# Patient Record
Sex: MALE | Race: OTHER | Employment: FULL TIME | URBAN - METROPOLITAN AREA
[De-identification: names, ages, dates, MRNs, and addresses within clinical notes are randomized per-mention and may not be internally consistent; named-entity substitution may affect disease eponyms.]

---

## 2021-04-12 ENCOUNTER — HOSPITAL ENCOUNTER (INPATIENT)
Facility: HOSPITAL | Age: 36
LOS: 2 days | Discharge: HOME | DRG: 391 | End: 2021-04-14
Attending: HOSPITALIST | Admitting: HOSPITALIST

## 2021-04-12 ENCOUNTER — HOSPITAL ENCOUNTER (OUTPATIENT)
Age: 36
Discharge: ANOTHER HOSPITAL | End: 2021-04-12

## 2021-04-12 ENCOUNTER — APPOINTMENT (OUTPATIENT)
Dept: CT IMAGING | Age: 36
End: 2021-04-12
Attending: NURSE PRACTITIONER

## 2021-04-12 VITALS
DIASTOLIC BLOOD PRESSURE: 89 MMHG | TEMPERATURE: 98 F | RESPIRATION RATE: 14 BRPM | OXYGEN SATURATION: 98 % | SYSTOLIC BLOOD PRESSURE: 125 MMHG | HEART RATE: 89 BPM

## 2021-04-12 DIAGNOSIS — K63.0 PERICOLONIC ABSCESS: ICD-10-CM

## 2021-04-12 DIAGNOSIS — R10.32 LLQ PAIN: Primary | ICD-10-CM

## 2021-04-12 DIAGNOSIS — R10.9 ABDOMINAL PAIN, ACUTE: ICD-10-CM

## 2021-04-12 PROBLEM — K65.1 INTRA-ABDOMINAL ABSCESS (HCC): Status: ACTIVE | Noted: 2021-04-12

## 2021-04-12 PROCEDURE — 80047 BASIC METABLC PNL IONIZED CA: CPT | Performed by: NURSE PRACTITIONER

## 2021-04-12 PROCEDURE — 85025 COMPLETE CBC W/AUTO DIFF WBC: CPT | Performed by: NURSE PRACTITIONER

## 2021-04-12 PROCEDURE — 81002 URINALYSIS NONAUTO W/O SCOPE: CPT | Performed by: NURSE PRACTITIONER

## 2021-04-12 PROCEDURE — U0002 COVID-19 LAB TEST NON-CDC: HCPCS | Performed by: NURSE PRACTITIONER

## 2021-04-12 PROCEDURE — 36415 COLL VENOUS BLD VENIPUNCTURE: CPT | Performed by: NURSE PRACTITIONER

## 2021-04-12 PROCEDURE — 74177 CT ABD & PELVIS W/CONTRAST: CPT | Performed by: NURSE PRACTITIONER

## 2021-04-12 PROCEDURE — 99205 OFFICE O/P NEW HI 60 MIN: CPT | Performed by: NURSE PRACTITIONER

## 2021-04-12 PROCEDURE — 99253 IP/OBS CNSLTJ NEW/EST LOW 45: CPT | Performed by: SURGERY

## 2021-04-12 PROCEDURE — 99223 1ST HOSP IP/OBS HIGH 75: CPT | Performed by: INTERNAL MEDICINE

## 2021-04-12 RX ORDER — SODIUM CHLORIDE 9 MG/ML
INJECTION, SOLUTION INTRAVENOUS CONTINUOUS
Status: DISCONTINUED | OUTPATIENT
Start: 2021-04-12 | End: 2021-04-14

## 2021-04-12 RX ORDER — HEPARIN SODIUM 5000 [USP'U]/ML
5000 INJECTION, SOLUTION INTRAVENOUS; SUBCUTANEOUS EVERY 8 HOURS SCHEDULED
Status: DISCONTINUED | OUTPATIENT
Start: 2021-04-12 | End: 2021-04-14

## 2021-04-12 RX ORDER — MORPHINE SULFATE 2 MG/ML
2 INJECTION, SOLUTION INTRAMUSCULAR; INTRAVENOUS EVERY 2 HOUR PRN
Status: DISCONTINUED | OUTPATIENT
Start: 2021-04-12 | End: 2021-04-14

## 2021-04-12 RX ORDER — ACETAMINOPHEN 325 MG/1
325 TABLET ORAL EVERY 6 HOURS PRN
COMMUNITY

## 2021-04-12 RX ORDER — SODIUM CHLORIDE 9 MG/ML
1000 INJECTION, SOLUTION INTRAVENOUS ONCE
Status: COMPLETED | OUTPATIENT
Start: 2021-04-12 | End: 2021-04-12

## 2021-04-12 RX ORDER — ACETAMINOPHEN 325 MG/1
650 TABLET ORAL EVERY 6 HOURS PRN
Status: DISCONTINUED | OUTPATIENT
Start: 2021-04-12 | End: 2021-04-13

## 2021-04-12 RX ORDER — MORPHINE SULFATE 2 MG/ML
1 INJECTION, SOLUTION INTRAMUSCULAR; INTRAVENOUS EVERY 2 HOUR PRN
Status: DISCONTINUED | OUTPATIENT
Start: 2021-04-12 | End: 2021-04-14

## 2021-04-12 RX ORDER — MORPHINE SULFATE 4 MG/ML
4 INJECTION, SOLUTION INTRAMUSCULAR; INTRAVENOUS EVERY 2 HOUR PRN
Status: DISCONTINUED | OUTPATIENT
Start: 2021-04-12 | End: 2021-04-14

## 2021-04-12 NOTE — PROGRESS NOTES
Received call from CT scan that MD will not perform CT drainage tonight d/t placement of abscess and will re-asess tomorrow. DAMON Norman paged to make aware. 1700 - Spoke with Cherie in CT and OK to give heparin dose but hold 2200 and 0600 dose.

## 2021-04-12 NOTE — CONSULTS
[facility]  Report of Consultation    Cale Richardson Patient Status:  Inpatient    1985 MRN [id number]   [facility] 3SW-A Attending Anastasia Cedeno MD   Hosp Day # 0 PCP No primary care provider on file.      Requ history is noncontributory. History:  No past medical history on file. No past surgical history on file. History reviewed. No pertinent family history.       Past medical history:  Noncontributory    Past social history:  The patient smokes 1 pack of c to person, place, and time. He appears well-developed and well-nourished. HENT:   Head: Normocephalic and atraumatic. Eyes: Pupils are equal, round, and reactive to light. EOM are normal.   Neck: Neck supple.    Cardiovascular: Normal rate and regular r abscess. 4. N.p.o.  5. Start IV antibiotic therapy. 6. Pain control as needed. 7. Antiemetics as needed. 8. Thank you for consultation. We will continue to follow. Madhu Otto  4/12/2021  3:52 PM    ADDENDUM:     Patient was seen and examined.

## 2021-04-12 NOTE — PLAN OF CARE
Pt A&Ox4. On room air. VSS. NPO. Last BM 4/12/21. Voiding without difficulty. IV zozyn Q8. Pain controlled at this time. Fall precautions in place and pt reminded to \"call; don't fall. \" Pt oriented to room and call light. POC discussed with pt.  Will con

## 2021-04-12 NOTE — H&P
[facility]  History and Physical     [address] Patient Status:  Inpatient    1985 MRN [id number]   [facility] 3SW-A Attending Lazarus Handy, [address] [facility] Day # 0 PCP No primary care provider on file.      Dilia Marmolejo rubs or gallops. Equal pulses. Chest and Back: No tenderness or deformity. Abdomen: Soft, diffuse tender, nondistended. Positive bowel sounds. No rebound, guarding or organomegaly. Neurologic: No focal neurological deficits. CNII-XII grossly intact.

## 2021-04-12 NOTE — ED INITIAL ASSESSMENT (HPI)
Pt sts intermittent lower abd pain, diarrhea, body aches, fever began 7 days. Fever as high as 103 that lasted 3 days. No fever since Saturday. Abd pain increased since Saturday. Feels bloated, intermittent nausea. Denies emesis. Formed stool today.

## 2021-04-12 NOTE — ED PROVIDER NOTES
Patient Seen in: [facility]      History   Patient presents with:  Abdomen/Flank Pain    Stated Complaint: abd pain    HPI/Subjective:   51-year-old male who presents to the IC with complaints of left lower quadrant pain for last 7 days.   Kathy Cornea clear. Ears: normal pending membranes. Normal external auditory canals   Nose: Nasal mucosa. No sinus tenderness. No nasal congestion. Throat: Oropharynx noninjected. No lip, tongue, throat swelling.  Buccal mucosa moist: yes  EYES: ANDREW IVAN techniques were used. Dose information is transmitted to the ACR [facility] [employer] [facility]) NRDR ([address]) which includes the Dose Index Registry.   PATIENT STATED HISTORY:(As transcribed by Technologist)  Patient states he has OTHER:                  None. CONCLUSION:  Acute inflammatory appearance of the sigmoid colon with 2 separate foci of pericolonic abscess formation.   Diverticulitis can produce this appearance, but there does not appear to be widespread divert [place] [address] [address]  [phone]  Today            Medications Prescribed:  Discharge Medication List as of 4/12/2021  1:44 PM

## 2021-04-13 PROCEDURE — 99232 SBSQ HOSP IP/OBS MODERATE 35: CPT | Performed by: HOSPITALIST

## 2021-04-13 PROCEDURE — 99232 SBSQ HOSP IP/OBS MODERATE 35: CPT | Performed by: SURGERY

## 2021-04-13 RX ORDER — FAMOTIDINE 10 MG/ML
20 INJECTION, SOLUTION INTRAVENOUS DAILY
Status: DISCONTINUED | OUTPATIENT
Start: 2021-04-13 | End: 2021-04-14

## 2021-04-13 RX ORDER — ACETAMINOPHEN 325 MG/1
650 TABLET ORAL EVERY 4 HOURS PRN
Status: DISCONTINUED | OUTPATIENT
Start: 2021-04-13 | End: 2021-04-14

## 2021-04-13 RX ORDER — FAMOTIDINE 20 MG/1
20 TABLET ORAL DAILY
Status: DISCONTINUED | OUTPATIENT
Start: 2021-04-13 | End: 2021-04-14

## 2021-04-13 NOTE — PLAN OF CARE
Pt A&O. On room air. Scds to BLE. Tolerating CLD. Had BM today. States had loose stool. Awaiting next stool to obtain sample for WBC, Culture, Shigatoxin, and c-diff. Voiding w/o difficulty. Pain managed with oral tylenol. Up independently.  Tolerating IV a
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
## 2021-04-13 NOTE — PROGRESS NOTES
[facility]  Progress Note    [address] Patient Status:  Inpatient    1985 MRN [id number]   [facility] 3SW-A Attending Wilver Elizabeth MD   Hosp Day # 1 PCP No primary care provider on file. Subjective:   The p liquid diet. 3. Continue IV antibiotics with Zosyn. 4. Will order stool studies. 5. Plan for outpatient colonoscopy in roughly 6 weeks as an outpatient. 6. Anticipate discharge within the next 48 hours.  Will require at least an additional 24h of an

## 2021-04-13 NOTE — PROGRESS NOTES
[facility]  Progress Note     Thelma Julianing Patient Status:  Inpatient    1985 MRN [id number]   [facility] 3SW-A Attending Belkis Curry MD   Hosp Day # 1 PCP No primary care provider on file.      Chief Complain Kinase  No results for input(s): CK in the last 168 hours. Inflammatory Markers  No results for input(s): CRP, FRANKY, LDH, DDIMER in the last 168 hours. Imaging: Imaging data reviewed in Epic.     Medications:   • Heparin Sodium (Porcine)  5,000 Units S

## 2021-04-13 NOTE — PLAN OF CARE
A&O x4. VSS. Room air, . Moderate abdominal pain controlled with tylenol PRN. Bowel sounds present, abdomen soft and tender. Ambulating ad robert. Voids freely. Bilateral SCD's. IVF and IV abx infusing as ordered. NPO.  Reviewed POC, pain management, IS use

## 2021-04-14 VITALS
HEART RATE: 72 BPM | TEMPERATURE: 98 F | RESPIRATION RATE: 16 BRPM | SYSTOLIC BLOOD PRESSURE: 107 MMHG | DIASTOLIC BLOOD PRESSURE: 76 MMHG | OXYGEN SATURATION: 95 %

## 2021-04-14 PROCEDURE — 99238 HOSP IP/OBS DSCHRG MGMT 30/<: CPT | Performed by: HOSPITALIST

## 2021-04-14 PROCEDURE — 99232 SBSQ HOSP IP/OBS MODERATE 35: CPT | Performed by: PHYSICIAN ASSISTANT

## 2021-04-14 RX ORDER — AMOXICILLIN AND CLAVULANATE POTASSIUM 875; 125 MG/1; MG/1
1 TABLET, FILM COATED ORAL 2 TIMES DAILY
Qty: 20 TABLET | Refills: 0 | Status: SHIPPED | OUTPATIENT
Start: 2021-04-14 | End: 2021-04-15 | Stop reason: ALTCHOICE

## 2021-04-14 NOTE — PLAN OF CARE
t A&O. On room air. Scds to BLE. Tolerating CLD. Last BM 4/13/21. Voiding w/o difficulty. Pain managed with oral tylenol. Up independently. Tolerating IV atbx as ordered. Pt improved w/o abscess drainage intervention.  Awaiting for surgery to round so pt ma

## 2021-04-14 NOTE — PROGRESS NOTES
[facility]  Progress Note    [address] Patient Status:  Inpatient    1985 MRN [id number]   [facility] 3SW-A Attending Douglas Leos MD   Hosp Day # 2 PCP No primary care provider on file. Subjective:   The p JEAN CARLOS  4/14/2021  11:53 AM

## 2021-04-14 NOTE — PLAN OF CARE
A&O x 4. VSS. On RA. LLQ pain well controlled with Tylenol PRN. Up at robert to bathroom, voiding without issue. Tolerating clear liquid diet. No c/o continued loose stool. IVF/IV Zosyn. SCD's when in bed/Heparin. Reviewed POC and pain management.  Plan home w

## 2021-04-15 ENCOUNTER — TELEPHONE (OUTPATIENT)
Dept: SURGERY | Facility: CLINIC | Age: 36
End: 2021-04-15

## 2021-04-15 RX ORDER — CIPROFLOXACIN 500 MG/1
500 TABLET, FILM COATED ORAL 2 TIMES DAILY
Qty: 20 TABLET | Refills: 0 | Status: SHIPPED | OUTPATIENT
Start: 2021-04-15 | End: 2021-04-25

## 2021-04-15 RX ORDER — LEVOFLOXACIN 500 MG/1
500 TABLET, FILM COATED ORAL DAILY
Qty: 10 TABLET | Refills: 0 | Status: SHIPPED | OUTPATIENT
Start: 2021-04-15 | End: 2021-04-15

## 2021-04-15 RX ORDER — CIPROFLOXACIN 500 MG/1
500 TABLET, FILM COATED ORAL 2 TIMES DAILY
Qty: 20 TABLET | Refills: 0 | Status: SHIPPED | OUTPATIENT
Start: 2021-04-15 | End: 2021-10-14

## 2021-04-15 RX ORDER — METRONIDAZOLE 250 MG/1
250 TABLET ORAL 3 TIMES DAILY
Qty: 30 TABLET | Refills: 0 | Status: SHIPPED | OUTPATIENT
Start: 2021-04-15 | End: 2021-10-14

## 2021-04-15 NOTE — TELEPHONE ENCOUNTER
Patient's wife called stating pharmacy did not receive the Cipro, another script was ordered and sent to pharmacy. Patient verified confirmation of script.

## 2021-04-16 ENCOUNTER — TELEPHONE (OUTPATIENT)
Dept: SURGERY | Facility: CLINIC | Age: 36
End: 2021-04-16

## 2021-04-16 NOTE — TELEPHONE ENCOUNTER
Pt's wife calling stating that the prescription for Ciprofloaxacin HCL (CIPRO) was cancelled per [address] in the pt's chart. Pt's wife requesting a new order for this medication be sent to the [address] in chart.

## 2021-04-17 NOTE — DISCHARGE SUMMARY
[facility]PITAL[facility]  DISCHARGE SUMMARY     Howardroseanna Bautistarafael Patient Status:  Inpatient    1985 MRN [id number]   [facility] 3SW-A Attending No att. providers found   [facility] Day # 2 PCP No primary care provider on file.      Evaristo Tarango No murmurs, rubs or gallops. Abdomen: Soft, nontender, nondistended. Positive bowel sounds. No rebound or guarding. Neurologic: No focal neurological deficits. Musculoskeletal: Moves all extremities. Extremities: No edema.   -------------------------

## 2021-04-26 ENCOUNTER — TELEPHONE (OUTPATIENT)
Dept: SURGERY | Facility: CLINIC | Age: 36
End: 2021-04-26

## 2021-04-26 NOTE — TELEPHONE ENCOUNTER
Patient's wife called stating patient would like to reschedule appointment from today to Wednesday and asked for a refill on Cipro. Patient states is not having abdominal pain currently.  Per PA patient advised refill will be considered at in-office appoint

## 2021-04-28 ENCOUNTER — OFFICE VISIT (OUTPATIENT)
Dept: SURGERY | Facility: CLINIC | Age: 36
End: 2021-04-28

## 2021-04-28 VITALS
BODY MASS INDEX: 24.99 KG/M2 | HEART RATE: 75 BPM | SYSTOLIC BLOOD PRESSURE: 112 MMHG | WEIGHT: 150 LBS | HEIGHT: 65 IN | TEMPERATURE: 97 F | DIASTOLIC BLOOD PRESSURE: 72 MMHG

## 2021-04-28 DIAGNOSIS — Z01.818 PRE-OP TESTING: ICD-10-CM

## 2021-04-28 DIAGNOSIS — K65.1 INTRA-ABDOMINAL ABSCESS (HCC): ICD-10-CM

## 2021-04-28 DIAGNOSIS — K57.92 ACUTE DIVERTICULITIS: Primary | ICD-10-CM

## 2021-04-28 PROCEDURE — 99213 OFFICE O/P EST LOW 20 MIN: CPT | Performed by: SURGERY

## 2021-04-28 PROCEDURE — 3074F SYST BP LT 130 MM HG: CPT | Performed by: SURGERY

## 2021-04-28 PROCEDURE — 3078F DIAST BP <80 MM HG: CPT | Performed by: SURGERY

## 2021-04-28 PROCEDURE — 3008F BODY MASS INDEX DOCD: CPT | Performed by: SURGERY

## 2021-04-28 RX ORDER — SODIUM, POTASSIUM,MAG SULFATES 17.5-3.13G
SOLUTION, RECONSTITUTED, ORAL ORAL
Qty: 1 KIT | Refills: 0 | Status: SHIPPED | OUTPATIENT
Start: 2021-04-28

## 2021-04-28 NOTE — H&P
New Patient Visit Note       Active Problems      1. Acute diverticulitis    2. Intra-abdominal abscess ([address]    3.  Pre-op testing        Chief Complaint   Patient presents with:  Diverticulitis: NP divertic- PT states had attack two weeks ago denies abd p Cans of beer per week      Drug use: Never       Current Outpatient Medications:   •  Na Sulfate-K Sulfate-Mg Sulf (SUPREP BOWEL PREP KIT) 17.5-3.13-1.6 GM/177ML Oral Solution, Take one bottle at 5pm & 9pm the night before procedure, Disp: 1 kit, Rfl: 0  • muscle usage or respiratory distress. Breath sounds: No decreased breath sounds, wheezing, rhonchi or rales. Chest:      Chest wall: No mass. Abdominal:      General: Bowel sounds are normal. There is no distension.       Palpations: Abdomen is sof his wife in detail. Risks included, but were not limited to, anastomotic breakdown, injury to other intra-abdominal organs, injury to the ureter, wound infection, and prolonged postoperative ileus.   · At this point, the patient does not wish to proceed wit

## 2021-04-30 ENCOUNTER — TELEPHONE (OUTPATIENT)
Dept: HEMATOLOGY/ONCOLOGY | Age: 36
End: 2021-04-30

## 2021-06-01 ENCOUNTER — LAB ENCOUNTER (OUTPATIENT)
Dept: LAB | Age: 36
End: 2021-06-01
Attending: SURGERY

## 2021-06-01 DIAGNOSIS — Z01.818 PRE-OP TESTING: ICD-10-CM

## 2021-06-04 ENCOUNTER — MED REC SCAN ONLY (OUTPATIENT)
Dept: SURGERY | Facility: CLINIC | Age: 36
End: 2021-06-04

## 2021-06-04 ENCOUNTER — TELEPHONE (OUTPATIENT)
Dept: SURGERY | Facility: CLINIC | Age: 36
End: 2021-06-04

## 2021-06-04 NOTE — TELEPHONE ENCOUNTER
Rec'd a call from Dr. Edel Shook me that pt was a no show for his Colonoscopy at [facility] today 6-4.  I called pt no answer, I left a message asking pt to call the office to reschedule his procedure

## 2021-10-14 ENCOUNTER — OFFICE VISIT (OUTPATIENT)
Dept: FAMILY MEDICINE CLINIC | Facility: CLINIC | Age: 36
End: 2021-10-14
Payer: COMMERCIAL

## 2021-10-14 VITALS
TEMPERATURE: 98 F | OXYGEN SATURATION: 99 % | SYSTOLIC BLOOD PRESSURE: 120 MMHG | BODY MASS INDEX: 24.45 KG/M2 | HEART RATE: 62 BPM | WEIGHT: 145 LBS | HEIGHT: 64.5 IN | DIASTOLIC BLOOD PRESSURE: 76 MMHG

## 2021-10-14 DIAGNOSIS — Z87.19 HISTORY OF DIVERTICULAR ABSCESS: ICD-10-CM

## 2021-10-14 DIAGNOSIS — F10.10 ALCOHOL ABUSE: ICD-10-CM

## 2021-10-14 DIAGNOSIS — R10.32 LEFT LOWER QUADRANT PAIN: Primary | ICD-10-CM

## 2021-10-14 PROCEDURE — 3074F SYST BP LT 130 MM HG: CPT | Performed by: NURSE PRACTITIONER

## 2021-10-14 PROCEDURE — 3078F DIAST BP <80 MM HG: CPT | Performed by: NURSE PRACTITIONER

## 2021-10-14 PROCEDURE — 3008F BODY MASS INDEX DOCD: CPT | Performed by: NURSE PRACTITIONER

## 2021-10-14 PROCEDURE — 99213 OFFICE O/P EST LOW 20 MIN: CPT | Performed by: NURSE PRACTITIONER

## 2021-10-14 RX ORDER — CIPROFLOXACIN 500 MG/1
500 TABLET, FILM COATED ORAL 2 TIMES DAILY
Qty: 20 TABLET | Refills: 0 | Status: SHIPPED | OUTPATIENT
Start: 2021-10-14 | End: 2021-10-24

## 2021-10-14 RX ORDER — METRONIDAZOLE 250 MG/1
250 TABLET ORAL 3 TIMES DAILY
Qty: 30 TABLET | Refills: 0 | Status: SHIPPED | OUTPATIENT
Start: 2021-10-14 | End: 2021-10-24

## 2021-10-14 NOTE — PROGRESS NOTES
Subjective:   Patient ID: Roz Medellin is a 39year old male. Patient presents to the clinic today accompanied by his wife for evaluation of lower left abdominal pain.   Reports his symptoms began approximately Sunday after eating a lot tablet 0   • Na Sulfate-K Sulfate-Mg Sulf (SUPREP BOWEL PREP KIT) 17.5-3.13-1.6 GM/177ML Oral Solution Take one bottle at 5pm & 9pm the night before procedure (Patient not taking: Reported on 10/14/2021) 1 kit 0   • acetaminophen 325 MG Oral Tab Take 325 m unable to pass gas, fever etc.  He is agreeable to the above plan and verbalized understanding. Patient states he is willing to see psych regarding alcohol abuse. [facility] referral placed.          Meds This Visit:  Requested Prescriptions     Signed Prescr

## 2021-10-14 NOTE — PATIENT INSTRUCTIONS
Start antibiotics as prescribed. Avoid foods that are acidic, seedy or could cause inflammation. Avoid carbonated and caffeinated drinks. Avoid foods that are too spicy or greasy. Follow-up with general surgery for colonoscopy.   Even if you begin to fee temperature. Tell your healthcare provider if you have a rising temperature. Preventing future attacks  Once you have an episode of diverticulitis, you are at risk for having it again.  But you may be able to lower your risk by eating a high-fiber diet (20 8/1/2019  © 7113-6902 [employer] [address]. All rights reserved. This information is not intended as a substitute for professional medical care. Always follow your healthcare professional's instructions.

## 2021-10-27 ENCOUNTER — TELEPHONE (OUTPATIENT)
Dept: FAMILY MEDICINE CLINIC | Facility: CLINIC | Age: 36
End: 2021-10-27

## 2021-10-27 DIAGNOSIS — Z12.11 ENCOUNTER FOR SCREENING COLONOSCOPY: Primary | ICD-10-CM

## 2021-10-27 NOTE — TELEPHONE ENCOUNTER
With patients with [facility], referral placed for [address] [place] [address]. She is asked to give them a call in 24 hours, she is asked to follow-up with the office should the referral not be in network. She is agreeable to this plan verbalized understanding.     Patient'

## 2021-10-27 NOTE — TELEPHONE ENCOUNTER
LOV 10/14/2021 jessica Fish - please advise if pt needs to follow up in office? Referral to general surgery for colonoscopy order pending, please review.  Thank you

## 2021-10-27 NOTE — TELEPHONE ENCOUNTER
Opal Montes pt's wife is calling would like to know if pt needs a follow up apt from last visit and were also told that pt can be referred for colonoscopy and would like to know where to go for that     Pt call back # [phone]    Thank you

## 2023-05-24 ENCOUNTER — HOSPITAL ENCOUNTER (OUTPATIENT)
Age: 38
Discharge: LEFT BEFORE DISCHARGE | End: 2023-05-24

## 2023-05-24 ENCOUNTER — APPOINTMENT (OUTPATIENT)
Dept: CT IMAGING | Age: 38
End: 2023-05-24
Attending: NURSE PRACTITIONER

## 2023-05-24 VITALS
RESPIRATION RATE: 16 BRPM | SYSTOLIC BLOOD PRESSURE: 134 MMHG | DIASTOLIC BLOOD PRESSURE: 88 MMHG | TEMPERATURE: 99 F | HEIGHT: 65 IN | HEART RATE: 94 BPM | BODY MASS INDEX: 27.49 KG/M2 | WEIGHT: 165 LBS | OXYGEN SATURATION: 98 %

## 2023-05-24 DIAGNOSIS — K57.20 PERICOLONIC ABSCESS DUE TO DIVERTICULITIS: Primary | ICD-10-CM

## 2023-05-24 LAB
#MXD IC: 0.4 X10ˆ3/UL (ref 0.1–1)
BUN BLD-MCNC: 7 MG/DL (ref 7–18)
CHLORIDE BLD-SCNC: 99 MMOL/L (ref 98–112)
CO2 BLD-SCNC: 24 MMOL/L (ref 21–32)
CREAT BLD-MCNC: 0.9 MG/DL
GFR SERPLBLD BASED ON 1.73 SQ M-ARVRAT: 113 ML/MIN/1.73M2 (ref 60–?)
GLUCOSE BLD-MCNC: 106 MG/DL (ref 70–99)
HCT VFR BLD AUTO: 42 %
HCT VFR BLD CALC: 44 %
HGB BLD-MCNC: 14.6 G/DL
ISTAT IONIZED CALCIUM FOR CHEM 8: 1.16 MMOL/L (ref 1.12–1.32)
LYMPHOCYTES # BLD AUTO: 0.6 X10ˆ3/UL (ref 1–4)
LYMPHOCYTES NFR BLD AUTO: 10.9 %
MCH RBC QN AUTO: 31.2 PG (ref 26–34)
MCHC RBC AUTO-ENTMCNC: 34.8 G/DL (ref 31–37)
MCV RBC AUTO: 89.7 FL (ref 80–100)
MIXED CELL %: 7 %
NEUTROPHILS # BLD AUTO: 4.4 X10ˆ3/UL (ref 1.5–7.7)
NEUTROPHILS NFR BLD AUTO: 82.1 %
PLATELET # BLD AUTO: 229 X10ˆ3/UL (ref 150–450)
POCT BILIRUBIN URINE: NEGATIVE
POCT GLUCOSE URINE: NEGATIVE MG/DL
POCT KETONE URINE: 15 MG/DL
POCT LEUKOCYTE ESTERASE URINE: NEGATIVE
POCT NITRITE URINE: NEGATIVE
POCT PH URINE: 7.5 (ref 5–8)
POCT PROTEIN URINE: 30 MG/DL
POCT SPECIFIC GRAVITY URINE: 1.02
POCT URINE CLARITY: CLEAR
POCT URINE COLOR: YELLOW
POCT UROBILINOGEN URINE: 1 MG/DL
POTASSIUM BLD-SCNC: 4 MMOL/L (ref 3.6–5.1)
RBC # BLD AUTO: 4.68 X10ˆ6/UL
SARS-COV-2 RNA RESP QL NAA+PROBE: NOT DETECTED
SODIUM BLD-SCNC: 134 MMOL/L (ref 136–145)
WBC # BLD AUTO: 5.4 X10ˆ3/UL (ref 4–11)

## 2023-05-24 PROCEDURE — 85025 COMPLETE CBC W/AUTO DIFF WBC: CPT | Performed by: NURSE PRACTITIONER

## 2023-05-24 PROCEDURE — U0002 COVID-19 LAB TEST NON-CDC: HCPCS | Performed by: NURSE PRACTITIONER

## 2023-05-24 PROCEDURE — 80047 BASIC METABLC PNL IONIZED CA: CPT | Performed by: NURSE PRACTITIONER

## 2023-05-24 PROCEDURE — 81002 URINALYSIS NONAUTO W/O SCOPE: CPT | Performed by: NURSE PRACTITIONER

## 2023-05-24 PROCEDURE — 99214 OFFICE O/P EST MOD 30 MIN: CPT | Performed by: NURSE PRACTITIONER

## 2023-05-24 PROCEDURE — 74177 CT ABD & PELVIS W/CONTRAST: CPT | Performed by: NURSE PRACTITIONER

## 2023-05-24 RX ORDER — CIPROFLOXACIN 500 MG/1
500 TABLET, FILM COATED ORAL 2 TIMES DAILY
Qty: 20 TABLET | Refills: 0 | Status: SHIPPED | OUTPATIENT
Start: 2023-05-24 | End: 2023-06-03

## 2023-05-24 RX ORDER — SODIUM CHLORIDE 9 MG/ML
1000 INJECTION, SOLUTION INTRAVENOUS ONCE
Status: COMPLETED | OUTPATIENT
Start: 2023-05-24 | End: 2023-05-24

## 2023-05-24 RX ORDER — SULFAMETHOXAZOLE AND TRIMETHOPRIM 800; 160 MG/1; MG/1
1 TABLET ORAL 2 TIMES DAILY
COMMUNITY
Start: 2023-05-23

## 2023-05-24 RX ORDER — METRONIDAZOLE 500 MG/1
500 TABLET ORAL 3 TIMES DAILY
Qty: 30 TABLET | Refills: 0 | Status: SHIPPED | OUTPATIENT
Start: 2023-05-24 | End: 2023-06-03

## 2023-05-24 NOTE — ED INITIAL ASSESSMENT (HPI)
Pt sts constant lower abd, LLQ abd pain began 2 days ago. Last night with body aches, fatigue, fever as high as 102.5.

## 2023-05-25 NOTE — DISCHARGE INSTRUCTIONS
Take antibiotic as directed. Please understand that it was recommended that you go to the hospital tonight however you declined. You may go to the emergency room at any time if you change your mind. If you get any fever, worsening pain, or vomiting you need to seek immediate evaluation in the emergency room. Close follow-up with GI. You also need to have a recheck with your primary care doctor tomorrow or Friday.

## 2024-03-20 ENCOUNTER — PATIENT OUTREACH (OUTPATIENT)
Dept: CASE MANAGEMENT | Age: 39
End: 2024-03-20

## 2024-03-20 NOTE — PROCEDURES
The office order for PCP removal request is Approved and finalized on March 20, 2024.    Thanks,  [facility] Team

## 2025-01-02 ENCOUNTER — HOSPITAL ENCOUNTER (OUTPATIENT)
Age: 40
Discharge: HOME | End: 2025-01-02
Payer: COMMERCIAL

## 2025-01-02 ENCOUNTER — APPOINTMENT (OUTPATIENT)
Dept: GENERAL RADIOLOGY | Age: 40
End: 2025-01-02
Attending: NURSE PRACTITIONER
Payer: COMMERCIAL

## 2025-01-02 VITALS
HEIGHT: 65 IN | WEIGHT: 170 LBS | RESPIRATION RATE: 16 BRPM | TEMPERATURE: 98 F | SYSTOLIC BLOOD PRESSURE: 138 MMHG | OXYGEN SATURATION: 97 % | BODY MASS INDEX: 28.32 KG/M2 | DIASTOLIC BLOOD PRESSURE: 103 MMHG | HEART RATE: 81 BPM

## 2025-01-02 DIAGNOSIS — M54.9 MID BACK PAIN: Primary | ICD-10-CM

## 2025-01-02 PROCEDURE — 72072 X-RAY EXAM THORAC SPINE 3VWS: CPT | Performed by: NURSE PRACTITIONER

## 2025-01-02 RX ORDER — ERGOCALCIFEROL 1.25 MG/1
50000 CAPSULE, LIQUID FILLED ORAL WEEKLY
COMMUNITY
Start: 2024-10-14

## 2025-01-02 NOTE — ED INITIAL ASSESSMENT (HPI)
Pt states was lifting a heavy jar filled with change when he felt pain to mid upper back a few months ago.  Pt states on Monday he was leaning over when he had another pain to area.  Pt states yesterday started having numbness/tingling to left arm

## 2025-01-02 NOTE — ED PROVIDER NOTES
Patient Seen in: [facility]      History     Chief Complaint   Patient presents with    Back Pain     Stated Complaint: upper mid back injury; left hand tingling    Subjective:   39-year-old male presents today with intermittent mid back pain.  States with certain movements exacerbates pain.  Denies any pain at this time.  Denies having any weakness to upper extremities.  Has felt tingling but states did sleep on his arm the other day which seemed to cause the symptoms.  No other symptoms or concerns.  The patient's medication list, past medical history and social history elements as listed in today's nurse's notes were reviewed and agreed (except as otherwise stated in the HPI).  The patient's family history reviewed and determined to be noncontributory to the presenting problem              Objective:     Past Medical History:    Diverticulitis              History reviewed. No pertinent surgical history.             Social History     Socioeconomic History    Marital status:    Tobacco Use    Smoking status: Every Day     Current packs/day: 1.00     Types: Cigarettes    Smokeless tobacco: Never   Vaping Use    Vaping status: Never Used   Substance and Sexual Activity    Alcohol use: Yes     Alcohol/week: 6.0 standard drinks of alcohol     Types: 6 Cans of beer per week    Drug use: Never     Social Drivers of Health      Received from [facility]    Social Connections    Received from [facility]    Housing Stability              Review of Systems    Positive for stated complaint: upper mid back injury; left hand tingling  Other systems are as noted in HPI.  Constitutional and vital signs reviewed.      All other systems reviewed and negative except as noted above.    Physical Exam     ED Triage Vitals [01/02/25 1326]   BP (!) 138/103   Pulse 81   Resp 16   Temp 98.3 °F (36.8 °C)   Temp src Oral   SpO2 97 %   O2 Device None (Room air)       Current Vitals:    Vital Signs  BP: (!) 138/103  Pulse: 81  Resp: 16  Temp: 98.3 °F (36.8 °C)  Temp src: Oral    Oxygen Therapy  SpO2: 97 %  O2 Device: None (Room air)        Physical Exam  Vitals and nursing note reviewed.   Constitutional:       Appearance: Normal appearance.   HENT:      Head: Normocephalic.      Mouth/Throat:      Mouth: Mucous membranes are moist.   Cardiovascular:      Rate and Rhythm: Normal rate.   Pulmonary:      Effort: Pulmonary effort is normal.   Musculoskeletal:      Comments: With palpation to the thoracic or cervical spine.  No bony crepitus no tenderness no step-off.   Skin:     General: Skin is warm and dry.   Neurological:      General: No focal deficit present.      Mental Status: He is alert and oriented to person, place, and time.      Sensory: No sensory deficit.      Motor: No weakness.             ED Course   Labs Reviewed - No data to display              XR THORACIC SPINE (3 VIEWS) (CPT=72072)    Result Date: 1/2/2025  PROCEDURE:  XR THORACIC SPINE (3 VIEWS) (CPT=72072)  TECHNIQUE:  AP, lateral, and swimmer's views of the thoracic spine were obtained.  COMPARISON:  None.  INDICATIONS:  upper mid back injury; left hand tingling  PATIENT STATED HISTORY: (As transcribed by Technologist)  Pt states was lifting a heavy jar filled with change when he felt pain to mid upper back a few months ago.  Pt states on Monday he was leaning over when he had another pain to area.  Pt states yesterday started having numbness/tingling to left arm.    FINDINGS:    BONES:  Normal.  No significant spondylosis, scoliosis, fracture, or visible bony lesion. DISC SPACES:  Normal.  No significant disc height narrowing, subluxation, or endplate abnormality. PARASPINOUS:  Negative.  No paraspinous abnormality is seen. OTHER:  Negative.             CONCLUSION:  Unremarkable thoracic spine radiographs.   LOCATION:  [facility]    Dictated by (CST): Harsha Pacheco MD on 1/02/2025 at 1:57 PM     Finalized by (CST): Junior  MD Harsha on 1/02/2025 at 1:57 PM       MDM     Please note that this report has been produced using speech recognition software and may contain errors related to that system including, but not limited to, errors in grammar, punctuation, and spelling, as well as words and phrases that possibly may have been recognized inappropriately.  If there are any questions or concerns, contact the dictating provider for clarification.              Medical Decision Making  Differential diagnosis includes but is not limited to: Back strain, nerve root compression, degenerative disc disease, spinal stenosis, sciatica      Presents today with history of mid back pain after bending over and cleaning tables.  Had similar issue when lifting a heavy jug of coins a month ago.  X-ray of thoracic spine showed no acute findings.  Encourage patient follow-up with his primary care physician if symptoms continue.  Patient denied having any pain at this time.  Instructed to alternate heat and ice to the area take over-the-counter NSAID and Tylenol and to do stretching exercises for future issues.  Patient verbalized understanding and agreed to plan of care.    Amount and/or Complexity of Data Reviewed  Radiology: ordered. Decision-making details documented in ED Course.     Details: X-ray thoracic spine        Disposition and Plan     Clinical Impression:  1. Mid back pain         Disposition:  Discharge  1/2/2025  2:06 pm    Follow-up:  [address]  [facility] [address]  [phone]    In 1 week  As needed    [address]  [facility] [address]  [phone]                Medications Prescribed:  Discharge Medication List as of 1/2/2025  2:09 PM              Supplementary Documentation:

## (undated) NOTE — LETTER
Date & Time: 5/24/2023, 7:46 PM  Patient: Juanis Samson  Encounter Provider(s):    BELGICA Moon         This certifies that Juanis Samson, a patient at an [address], am leaving the facility voluntarily and against the advice of my physician. I acknowledge that I have been:    1. informed that my physician believes that I need to receive care here;  2. informed that if I leave, I could become sicker or even die; and  3. provided discharge instructions consistent with my current diagnosis. I hereby release my physician, the facility, and its employees from all responsibility for any ill effects which may result from this action. __________________________________  Patient or authorized caregiver signature    __________________________________  RN signature    If no patient or patient representative signature was obtained, sign below to acknowledge that the form was reviewed with the patient and that the patient refused to sign.     __________________________________  RN signature